# Patient Record
Sex: MALE | URBAN - METROPOLITAN AREA
[De-identification: names, ages, dates, MRNs, and addresses within clinical notes are randomized per-mention and may not be internally consistent; named-entity substitution may affect disease eponyms.]

---

## 2023-05-04 ENCOUNTER — OFFICE VISIT (OUTPATIENT)
Dept: ORTHOPEDIC SURGERY | Age: 28
End: 2023-05-04

## 2023-05-04 VITALS — WEIGHT: 130 LBS

## 2023-05-04 DIAGNOSIS — G40.833: ICD-10-CM

## 2023-05-04 DIAGNOSIS — M79.671 RIGHT FOOT PAIN: ICD-10-CM

## 2023-05-04 DIAGNOSIS — M76.821 POSTERIOR TIBIAL TENDON DYSFUNCTION (PTTD) OF RIGHT LOWER EXTREMITY: Primary | ICD-10-CM

## 2023-05-04 DIAGNOSIS — M25.571 ACUTE RIGHT ANKLE PAIN: ICD-10-CM

## 2023-05-04 RX ORDER — LACOSAMIDE 50 MG/1
100 TABLET ORAL 2 TIMES DAILY
COMMUNITY

## 2023-05-04 RX ORDER — CANNABIDIOL 100 MG/ML
100 SOLUTION ORAL 4 TIMES DAILY
COMMUNITY

## 2023-05-04 RX ORDER — PERAMPANEL 0.5 MG/ML
0.5 SUSPENSION ORAL 2 TIMES DAILY
COMMUNITY

## 2023-05-04 RX ORDER — LORATADINE 10 MG/1
10 TABLET ORAL DAILY
COMMUNITY

## 2023-05-04 RX ORDER — MONTELUKAST SODIUM 10 MG/1
10 TABLET ORAL NIGHTLY
COMMUNITY

## 2023-05-04 RX ORDER — CLONAZEPAM 2 MG/1
2 TABLET ORAL 3 TIMES DAILY PRN
COMMUNITY

## 2023-05-04 NOTE — PROGRESS NOTES
fracture. Pes planus. IMPRESSION:    1- Right posterior-medial ankle pain/ posterior tibial dysfunction/ pes planus with sever pronation . 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 We recommended stretching exercises of the calf and ankle which was taught to the Mom today. He will take NSAIDS. I believe he will benefit from PTTD brace, and that was applied in the office today and instructed him in care. We discussed the risk of progression. We recommended referral for Botox injection to help his peroneal muscle spasticity. Procedures    Aircast Airlift PTTD Ankle Brace     Patient was prescribed an Aircast Airlift PTTD Brace. The right ankle will require stabilization / immobilization from this semi-rigid / rigid orthosis to improve their function. The orthosis will assist in protecting the affected area, provide functional support and facilitate healing. Patient was instructed to progress ambulation weight bearing as tolerated in the device. The patient was educated and fit by a healthcare professional with expert knowledge and specialization in brace application while under the direct supervision of the treating physician. Verbal and written instructions for the use of and application of this item were provided. They were instructed to contact the office immediately should the brace result in increased pain, decreased sensation, increased swelling or worsening of the condition.        Jose Maria Chong MD Dosing Month 1 (Required For Cumulative Dosing): 30mg Daily Display Individual Monthly Dosage In The Note (If Yes Will Display All Dosages Which Are Not N/A): yes Myalgia Monitoring: I explained this is common when taking isotretinoin. If this worsens they will contact us. Months Of Therapy Completed: 4 Next Month's Dosage: 30mg BID Retinoid Dermatitis Aggressive Treatment: I recommended more frequent application of Cetaphil or CeraVe to the areas of dermatitis. I also prescribed a topical steroid for twice daily use until the dermatitis resolves. Headache Monitoring: I recommended monitoring the headaches for now. There is no evidence of increased intracranial pressure. They were instructed to call if the headaches are worsening. Xerosis Aggressive Treatment: I recommended application of Cetaphil or CeraVe numerous times a day going to bed to all dry areas. I also prescribed a topical steroid for twice daily use. Hypertriglyceridemia Monitoring: I explained this is common when taking isotretinoin. We will monitor closely. Weight Units: pounds Retinoid Dermatitis Normal Treatment: I recommended more frequent application of Cetaphil or CeraVe to the areas of dermatitis. Patient Weight (Optional But Required For Cumulative Dose-Numbers And Decimals Only): 130 Hypertriglyceridemia Treatment: I explained this is common when taking isotretinoin. If this worsens they will contact us. They may try OTC ibuprofen. Xerosis Normal Treatment: I recommended application of Cetaphil or CeraVe numerous times a day going to bed to all dry areas. Use Enhanced Counseling Feature (Automatic): No Cheilitis Aggressive Treatment: I recommended application of Vaseline or Aquaphor numerous times a day (as often as every hour) and before going to bed. I also prescribed a topical steroid for twice daily use. Target Cumulative Dosage (In Mg/Kg): 135 Ipledge Number (Optional): [id number] Detail Level: Zone Nosebleeds Normal Treatment: I explained this is common when taking isotretinoin. I recommended saline mist in each nostril multiple times a day. If this worsens they will contact us. Upper Range (In Mg/Kg): 150 Male Completion Statement: After discussing his treatment course we decided to discontinue isotretinoin therapy at this time. He shouldn't donate blood for one month after the last dose. He should call with any new symptoms of depression. What Is The Patient's Gender: Female Female Completion Statement: After discussing her treatment course we decided to discontinue isotretinoin therapy at this time. I explained that she would need to continue her birth control methods for at least one month after the last dosage. She should also get a pregnancy test one month after the last dose. She shouldn't donate blood for one month after the last dose. She should call with any new symptoms of depression. Lower Range (In Mg/Kg): 120 Kilograms Preamble Statement (Weight Entered In Details Tab): Reported Weight in kilograms: Pounds Preamble Statement (Weight Entered In Details Tab): Reported Weight in pounds: Xerosis Aggressive Treatment: I recommended application of Cetaphil or CeraVe numerous times a day and before going to bed to all dry areas. I also prescribed a topical steroid for twice daily use. Cheilitis Normal Treatment: I recommended application of Vaseline or Aquaphor numerous times a day (as often as every hour) and before going to bed. Use Therapeutic Ranged Or Therapeutic Target: please select Range or Target Female Pregnancy Counseling Text: Female patients should also be on two forms of birth control while taking this medication and for one month after their last dose. Xerosis Normal Treatment: I recommended application of Cetaphil or CeraVe numerous times a day and before going to bed to all dry areas. Counseling Text: I reviewed the side effect in detail. Patient should get monthly blood tests, not donate blood, not drive at night if vision affected, and not share medication.

## 2023-05-09 ENCOUNTER — TELEPHONE (OUTPATIENT)
Dept: ORTHOPEDIC SURGERY | Age: 28
End: 2023-05-09

## 2023-05-09 NOTE — TELEPHONE ENCOUNTER
Spoke with patient's mother to offer explanation on brace fit and use. Asked her to callback and set up an appointment with DME if further issues arise.

## 2023-05-09 NOTE — TELEPHONE ENCOUNTER
General Question     Subject: CAST INSTRUCTIONS  Patient and /or Facility Request: Blaze Ching  Contact Number:  [phone]    PATIENTS MOTHER CALLING STATING THAT SHE NEEDS INSTRUCTIONS ON HOW TO HANDLE THE CAST THAT IS ON THE PATIENT'S RIGHT FOOT. PATIENT STATES THE INSTRUCTIONS CAN BE EMAILED TO HER   [email or web address]  OR SENT VIA TEXT MESSAGE AT THE NUMBER ABOVE